# Patient Record
Sex: FEMALE | Race: WHITE | NOT HISPANIC OR LATINO | Employment: UNEMPLOYED | URBAN - METROPOLITAN AREA
[De-identification: names, ages, dates, MRNs, and addresses within clinical notes are randomized per-mention and may not be internally consistent; named-entity substitution may affect disease eponyms.]

---

## 2017-08-07 ENCOUNTER — TELEPHONE (OUTPATIENT)
Dept: SURGERY | Facility: CLINIC | Age: 52
End: 2017-08-07

## 2017-08-07 NOTE — TELEPHONE ENCOUNTER
----- Message from Carla Cosme sent at 8/7/2017 12:16 PM CDT -----  Contact: SHAY/SISTER/CARE GIVER  [phone]  States that pt had a peg tube that was removed about a month ago. States that there is a yellow colored drainage coming from the stoma. States that this morning the stoma was bleeding. Please call back at [phone]//thank you acc

## 2017-08-07 NOTE — TELEPHONE ENCOUNTER
"Returned call s/w Lexus Gonzalez/Care giver in rg to Abd/Stoma, Lexus stated,"she didn't mean for me to call her back she had scheduled appointment for 8/8/17 @ 2:00 PM with Dr. Weaver. Encouraged to call with any other questions/concerns. Voiced understanding  "

## 2017-08-08 ENCOUNTER — OFFICE VISIT (OUTPATIENT)
Dept: SURGERY | Facility: CLINIC | Age: 52
End: 2017-08-08
Payer: COMMERCIAL

## 2017-08-08 VITALS
WEIGHT: 145.75 LBS | DIASTOLIC BLOOD PRESSURE: 73 MMHG | BODY MASS INDEX: 25.01 KG/M2 | SYSTOLIC BLOOD PRESSURE: 121 MMHG | HEART RATE: 95 BPM | TEMPERATURE: 99 F

## 2017-08-08 DIAGNOSIS — Z93.1 STATUS POST INSERTION OF PERCUTANEOUS ENDOSCOPIC GASTROSTOMY (PEG) TUBE: Primary | ICD-10-CM

## 2017-08-08 PROCEDURE — 99202 OFFICE O/P NEW SF 15 MIN: CPT | Mod: S$GLB,,, | Performed by: SURGERY

## 2017-08-08 PROCEDURE — 99999 PR PBB SHADOW E&M-EST. PATIENT-LVL II: CPT | Mod: PBBFAC,,, | Performed by: SURGERY

## 2017-08-08 PROCEDURE — 3008F BODY MASS INDEX DOCD: CPT | Mod: S$GLB,,, | Performed by: SURGERY

## 2017-08-28 ENCOUNTER — HOSPITAL ENCOUNTER (EMERGENCY)
Facility: HOSPITAL | Age: 52
Discharge: HOME | End: 2017-08-28
Attending: EMERGENCY MEDICINE
Payer: COMMERCIAL

## 2017-08-28 VITALS
OXYGEN SATURATION: 99 % | RESPIRATION RATE: 22 BRPM | BODY MASS INDEX: 24.75 KG/M2 | HEART RATE: 114 BPM | HEIGHT: 64 IN | DIASTOLIC BLOOD PRESSURE: 69 MMHG | WEIGHT: 145 LBS | TEMPERATURE: 98 F | SYSTOLIC BLOOD PRESSURE: 155 MMHG

## 2017-08-28 DIAGNOSIS — R06.02 SHORTNESS OF BREATH: Primary | ICD-10-CM

## 2017-08-28 DIAGNOSIS — Z43.0 TRACHEOSTOMY CARE: ICD-10-CM

## 2017-08-28 PROCEDURE — 99900022

## 2017-08-28 PROCEDURE — 99283 EMERGENCY DEPT VISIT LOW MDM: CPT

## 2017-08-28 NOTE — PROGRESS NOTES
Trach care done. Site cleaned. Inner cannula changed. Drainage sponge placed under site to help prevent breakdown when trach sits on base of neck. Suctioned thick amounts of yellow sputum. Prior to trach care inner cannula was plugged with dry suctions; Patient stated that she has not suction or humidified the entire day. Patient educated on trach care at this time.

## 2017-08-28 NOTE — ED PROVIDER NOTES
SCRIBE #1 NOTE: I, Daina Oreilly, am scribing for, and in the presence of, . I have scribed the entire note.      History      Chief Complaint   Patient presents with    Shortness of Breath     pt began feeling SOB last night around 0200, pt reports cough with it, pt has trach       Review of patient's allergies indicates:  No Known Allergies     HPI   HPI    8/28/2017, 5:05 PM   History obtained from the patient      History of Present Illness: Lou Bhatt is a 51 y.o. female patient who presents to the Emergency Department for SOB which onset suddenly 4 hours PTA. Symptoms are constant and moderate in severity. No mitigating or exacerbating factors reported. Associated sxs include a cough, sore throat, and neck pain. Pt states the cough began at 0200 today. Patient denies any fever, chills, CP, BLE edema/pain, extremity weakness/numbness, n/v, HA, recent travel/long car rides, and all other sxs at this time. Pt has a Tracheostomy in place. Prior Tx includes Ibuprofen with slight relief. No further complaints or concerns at this time.       Arrival mode: Personal vehicle      PCP: Souleymane Dennison MD       Past Medical History:  Past Medical History:   Diagnosis Date    ADD (attention deficit disorder)     Anxiety     Hyperlipemia        Past Surgical History:  Past Surgical History:   Procedure Laterality Date    HYSTERECTOMY           Family History:  History reviewed. No pertinent family history.    Social History:  Social History     Social History Main Topics    Smoking status: Current Every Day Smoker    Smokeless tobacco: unknown    Alcohol use Yes    Drug use: Unknown    Sexual activity: unknown       ROS   Review of Systems   Constitutional: Negative for chills and fever.   HENT: Positive for sore throat.    Respiratory: Positive for cough and shortness of breath.    Cardiovascular: Negative for chest pain and leg swelling.   Gastrointestinal: Negative for nausea and vomiting.   Genitourinary:  "Negative for dysuria.   Musculoskeletal: Positive for neck pain. Negative for back pain and myalgias.   Skin: Negative for rash.   Neurological: Negative for weakness, numbness and headaches.   Hematological: Does not bruise/bleed easily.   All other systems reviewed and are negative.      Physical Exam      Initial Vitals [08/28/17 1654]   BP Pulse Resp Temp SpO2   (!) 155/69 (!) 114 (!) 22 98.1 °F (36.7 °C) 99 %      MAP       97.67          Physical Exam  Nursing Notes and Vital Signs Reviewed.  Constitutional: Patient is in no acute distress. Well-developed and well-nourished.  Head: Atraumatic. Normocephalic.  Eyes: PERRL. EOM intact. Conjunctivae are not pale. No scleral icterus.  ENT: Mucous membranes are moist. Oropharynx is clear and symmetric.    Neck: Supple. Full ROM. No lymphadenopathy.  Cardiovascular: Regular rate. Regular rhythm. No murmurs, rubs, or gallops. Distal pulses are 2+ and symmetric.  Pulmonary/Chest: Tracheostomy in place. No respiratory distress. Clear to auscultation bilaterally. No wheezing, rales, or rhonchi. No stridor.  Abdominal: Soft and non-distended.  There is no tenderness.  No rebound, guarding, or rigidity.   Musculoskeletal: Orthopedic device to RUE. Moves all extremities. No obvious deformities. No edema. No calf tenderness.  Skin: Warm and dry.  Neurological:  Alert, awake, and appropriate.  Normal speech.  No acute focal neurological deficits are appreciated.  Psychiatric: Normal affect. Good eye contact. Appropriate in content.    ED Course    Procedures  ED Vital Signs:  Vitals:    08/28/17 1654   BP: (!) 155/69   Pulse: (!) 114   Resp: (!) 22   Temp: 98.1 °F (36.7 °C)   TempSrc: Oral   SpO2: 99%   Weight: 65.8 kg (145 lb)   Height: 5' 4" (1.626 m)         Imaging Results:  Imaging Results          X-Ray Chest PA And Lateral (Final result)  Result time 08/28/17 18:05:48    Final result by Angelo Avalos MD (08/28/17 18:05:48)                 Impression:     Right lower " lobe pneumonia.            Electronically signed by: CONNIE DUNN MD  Date:     08/28/17  Time:    18:05              Narrative:    Exam: XR CHEST PA AND LATERAL    Clinical History: Shortness of breath.     Findings:     Patchy alveolar opacities in the right lower lobe.  Adjacent remained healed right-sided rib fractures. The cardiac silhouette is within normal limits.  Tracheostomy cannula present.  Left humeral diaphyseal hardware.                                      The Emergency Provider reviewed the vital signs and test results, which are outlined above.    ED Discussion     6:14 PM: Reassessed pt at this time.  Pt states her condition has improved at this time. Discussed with pt all pertinent ED information and results. Discussed pt and plan of tx. Gave pt all f/u and return to the ED instructions. Pt has a f/u appointment to have her trach removed tomorrow. All questions and concerns were addressed at this time. Pt expresses understanding of information and instructions, and is comfortable with plan to discharge. Pt is stable for discharge.    Patient back to baseline after suctioning.  CXR with possible infiltrate but no old studies to compare.  Suspect post surgical changes from decortication as opposed to pneumonia as symptoms resolved after suction.  Has close f/u with ENT tomorrow.    I discussed with patient and/or family/caretaker that evaluation in the ED does not suggest any emergent or life threatening medical conditions requiring immediate intervention beyond what was provided in the ED, and I believe patient is safe for discharge.  Regardless, an unremarkable evaluation in the ED does not preclude the development or presence of a serious of life threatening condition. As such, patient was instructed to return immediately for any worsening or change in current symptoms.      ED Medication(s):  Medications - No data to display    New Prescriptions    No medications on file       Follow-up  Information     Cassius Mariscal MD In 1 day.    Specialties:  Plastic Surgery, Otolaryngology  Contact information:  [address]  [place] [address]  [facility] [address]             [facility] - .    Specialty:  Emergency Medicine  Why:  If symptoms worsen  Contact information:  [address]  [facility] [address] [phone]                   Medical Decision Making    Medical Decision Making:   Clinical Tests:   Radiological Study: Ordered and Reviewed           Scribe Attestation:   Scribe #1: I performed the above scribed service and the documentation accurately describes the services I performed. I attest to the accuracy of the note.    Attending:   Physician Attestation Statement for Scribe #1: I, Syed Hayes MD, personally performed the services described in this documentation, as scribed by Daina Oreilly, in my presence, and it is both accurate and complete.          Clinical Impression       ICD-10-CM ICD-9-CM   1. Shortness of breath R06.02 786.05   2. Tracheostomy care Z43.0 V55.0       Disposition:   Disposition: Discharged  Condition: Stable         Syed Hayes MD  08/28/17 1828